# Patient Record
Sex: MALE | Race: BLACK OR AFRICAN AMERICAN | Employment: UNEMPLOYED | URBAN - METROPOLITAN AREA
[De-identification: names, ages, dates, MRNs, and addresses within clinical notes are randomized per-mention and may not be internally consistent; named-entity substitution may affect disease eponyms.]

---

## 2019-02-13 ENCOUNTER — HOSPITAL ENCOUNTER (OUTPATIENT)
Dept: NON INVASIVE DIAGNOSTICS | Age: 39
Discharge: HOME | End: 2019-02-13
Attending: NURSE PRACTITIONER
Payer: COMMERCIAL

## 2019-02-13 VITALS
WEIGHT: 220 LBS | SYSTOLIC BLOOD PRESSURE: 163 MMHG | HEIGHT: 73 IN | DIASTOLIC BLOOD PRESSURE: 103 MMHG | BODY MASS INDEX: 29.16 KG/M2

## 2019-02-13 DIAGNOSIS — R07.9 CHEST PAIN: ICD-10-CM

## 2019-02-13 LAB
STRESS ANGINA INDEX: 0
STRESS BASELINE HR: 71 BPM
STRESS ESTIMATED WORKLOAD: 10 METS
STRESS EXERCISE DUR MIN: NORMAL
STRESS PEAK DIAS BP: 92 MMHG
STRESS PEAK SYS BP: 184 MMHG
STRESS PERCENT HR ACHIEVED: 94 %
STRESS POST PEAK HR: 146 BPM
STRESS RATE PRESSURE PRODUCT: NORMAL BPM*MMHG
STRESS SR DUKE TREADMILL SCORE: -5
STRESS ST DEPRESSION: 1 MM
STRESS TARGET HR: 155 BPM

## 2019-02-13 PROCEDURE — 93351 STRESS TTE COMPLETE: CPT

## 2019-05-17 ENCOUNTER — OFFICE VISIT (OUTPATIENT)
Dept: CARDIOLOGY CLINIC | Age: 39
End: 2019-05-17

## 2019-05-17 DIAGNOSIS — R07.2 PRECORDIAL PAIN: Primary | ICD-10-CM

## 2019-05-17 RX ORDER — ERGOCALCIFEROL 1.25 MG/1
CAPSULE ORAL
Refills: 0 | COMMUNITY
Start: 2019-03-06 | End: 2022-06-27

## 2019-05-17 RX ORDER — METOPROLOL SUCCINATE 25 MG/1
12.5 TABLET, EXTENDED RELEASE ORAL DAILY
COMMUNITY
End: 2019-05-20 | Stop reason: SDUPTHER

## 2019-05-17 RX ORDER — AMLODIPINE BESYLATE 5 MG/1
TABLET ORAL
Refills: 0 | COMMUNITY
Start: 2019-03-06 | End: 2022-06-27

## 2019-05-17 RX ORDER — METFORMIN HYDROCHLORIDE 500 MG/1
TABLET, EXTENDED RELEASE ORAL
Refills: 0 | COMMUNITY
Start: 2019-03-06 | End: 2022-06-27

## 2019-05-17 NOTE — PROGRESS NOTES
1. Have you been to the ER, urgent care clinic since your last visit? Hospitalized since your last visit? No     2. Have you seen or consulted any other health care providers outside of the [address] [place] [address] since your last visit? Include any pap smears or colon screening.   Yes

## 2019-05-17 NOTE — PROGRESS NOTES
[facility]    Mr. Henok Mojica is 39-year male with a history of diabetes, hypertension hyperlipidemia    Patient is here today for cardiac evaluation. He denies any prior history of myocardial infarction or congestive heart failure. Patient has been experiencing on and off left arm dull aching discomfort especially on exertion for couple years. He has undergone stress test twice for this reason. He described this discomfort as a dull aching sensation especially when he is exerting himself or mowing alone and usually resolves after rest.  This last for about less than 2 to 3 minutes. There is no associated nausea vomiting or diaphoresis. Occasionally he would feel some funny sensation in the chest however left arm dull achiness is the main concern. He denies any palpitation, presyncope or syncope. He denies PND or lower extremity swelling. There is no resting discomfort  Denies any nausea, vomiting, abdominal pain, fever, chills, sputum production. No hematuria or other bleeding complaints    Past Medical History:   Diagnosis Date    Diabetes ([facility] [address]     HLD (hyperlipidemia)     HTN (hypertension)          No past surgical history on file. Current Outpatient Medications   Medication Sig    amLODIPine (NORVASC) 5 mg tablet take 1 tablet by mouth once daily    metFORMIN ER (GLUCOPHAGE XR) 500 mg tablet take 1 tablet by mouth twice a day    metoprolol succinate (TOPROL XL) 25 mg XL tablet Take 12.5 mg by mouth daily.  VITAMIN D2 50,000 unit capsule take 1 capsule by mouth every week with food     No current facility-administered medications for this visit. Allergies and Sensitivities:  No Known Allergies    Family History:  No family history on file.     Social History:  Social History     Tobacco Use    Smoking status: Not on file   Substance Use Topics    Alcohol use: Not on file    Drug use: Not on file     She  has no tobacco history on file. She  has no alcohol history on file. Review of Systems:  Cardiac symptoms as noted above in HPI. All others negative. Denies fatigue, malaise, skin rash, joint pain, blurring vision, photophobia, neck pain, hemoptysis, chronic cough, nausea, vomiting, hematuria, burning micturition, BRBPR, chronic headaches. Physical Exam:  BP Readings from Last 3 Encounters:   02/13/19 (!) 163/103         Pulse Readings from Last 3 Encounters:   No data found for Pulse          Wt Readings from Last 3 Encounters:   02/13/19 220 lb (99.8 kg)       Constitutional: Oriented to person, place, and time. HENT: Head: Normocephalic and atraumatic. Eyes: Conjunctivae and extraocular motions are normal.   Neck: No JVD present. Carotid bruit is not appreciated. Cardiovascular: Regular rhythm. No murmur, gallop or rubs appreciated  Lung: Breath sounds normal. No respiratory distress. No ronchi or rales appreciated  Abdominal: No tenderness. No rebound and no guarding. Musculoskeletal: There is no lower extremity edema. No cynosis  Lymphadenopathy:  No cervical or supraclavicular adenopathy appriciated. Neurological: No gross motor deficit noted. Skin: No visible skin rash noted. No Ear discharge noted  Psychiatric: Normal mood and affect. Good distal pulse      Review of Data  LABS:   No results found for: NA, K, CL, CO2, GLU, BUN, CREA  No flowsheet data found. No results found for: GPT, ALT  No results found for: HBA1C, HGBE8, KPT0JZON, GCN5AMJJ    EKG    ECHO    STRESS TEST (02/16)  Exercise duration 9 minutes. Inconclusive EKG portion  Baseline Echocardiographic Data Normal baseline left ventricular systolic function. No pericardial effusion. Stress Echocardiographic Data Left ventricular cavity size decreased from baseline. Left ventricular systolic function improved from baseline. Normal wall motion, unchanged from baseline.      IMPRESSION & PLAN:  Mr. Chino Hodge is 70-year-old male with diabetes, hypertension, hyper lipidemia    Diabetes:  He is supposed to be on metformin however he has recently stopped taking metformin assuming that may be causing his symptoms. I have asked him that he should talk to primary care provider and restart metformin. Goal hemoglobin A1c less than 7 is recommended from cardia vascular standpoint    Hypertension:  Blood pressure today is 138/92 Arias mercury. He is on Norvasc. I will start him on Toprol-XL 12.5 mg daily    Hyperlipidemia:  He said he has hyperlipidemia and he is taking a medication. He does not remember the name. He was advised to go home and call us with a list of medication    Left arm discomfort:  Patient has been expressing exertional left arm discomfort with occasional dull chest discomfort for couple years. He had a nuclear stress test at [facility] in 2016 which was low risk test.  He also recently had a stress echocardiogram which was also unremarkable. Both test are low risk test.  I discussed with patient today and the mother regarding low risk nature of stress test.  His symptoms are ongoing and somewhat concerning especially because of his risk factor. I discussed and offered left heart catheterization however patient has refused any invasive work-up. Risk benefits alternatives were discussed with the patient. I am going to ask him that he undergo CT coronary angiogram for coronary evaluation because of ongoing symptoms    This plan was discussed with patient who is in agreement. Thank you for allowing me to participate in patient care. Please feel free to call me if you have any question or concern. Di Feliz MD  Please note: This document has been produced using voice recognition software. Unrecognized errors in transcription may be present.

## 2019-05-20 RX ORDER — METOPROLOL SUCCINATE 25 MG/1
12.5 TABLET, EXTENDED RELEASE ORAL DAILY
Qty: 30 TAB | Refills: 6 | Status: SHIPPED | OUTPATIENT
Start: 2019-05-20 | End: 2022-06-27

## 2019-05-20 NOTE — TELEPHONE ENCOUNTER
Had to call patient to verify pharmacy. Rx per Dr. Randhawa Model from 5/17/19 office visit.        Verbal order and read back per Mary Arcos MD

## 2019-06-06 ENCOUNTER — TELEPHONE (OUTPATIENT)
Dept: CARDIOLOGY CLINIC | Age: 39
End: 2019-06-06

## 2019-06-06 NOTE — LETTER
6/11/2019 2:41 PM    Ms. Espinoza [address] [place]  [address]  [place] [address]      Dear Nessa Benítez,      I hope this letter finds you well. I have been unable to reach you via phone. We received a fax from [facility] stating that you will need to have premedication of metoprolol before testing. Verbal order and read back per Isi Vasquez MD  Patient is to take metoprolol 25 mg tablet 12 hrs before procedure and then 1 hrs before scan. Sincerely,       DINA Rashid LPN

## 2019-06-06 NOTE — TELEPHONE ENCOUNTER
Received fax from [facility] stating that pt needs to have premedication of metoprolol before testing. Verbal order and read back per Beni Estrella MD  Pt is to take metoprolol 25 mg tablet 12 hrs before procedure and then 1 hrs before scan.

## 2019-06-11 NOTE — TELEPHONE ENCOUNTER
Attempted to contact pt at  number, no answer. No vm p/u. After unsuccessful attempts to contact pt will send letter to address on file.

## 2022-04-20 ENCOUNTER — HOSPITAL ENCOUNTER (OUTPATIENT)
Dept: ULTRASOUND IMAGING | Age: 42
Discharge: HOME | End: 2022-04-20
Attending: FAMILY MEDICINE
Payer: MEDICAID

## 2022-04-20 ENCOUNTER — TRANSCRIBE ORDER (OUTPATIENT)
Dept: SCHEDULING | Age: 42
End: 2022-04-20

## 2022-04-20 DIAGNOSIS — N50.89 SWELLING OF RIGHT TESTICLE: ICD-10-CM

## 2022-04-20 DIAGNOSIS — N50.89 SWELLING OF RIGHT TESTICLE: Primary | ICD-10-CM

## 2022-04-20 PROCEDURE — 93975 VASCULAR STUDY: CPT

## 2022-04-25 ENCOUNTER — TRANSCRIBE ORDER (OUTPATIENT)
Dept: SCHEDULING | Age: 42
End: 2022-04-25

## 2022-05-02 ENCOUNTER — TRANSCRIBE ORDER (OUTPATIENT)
Dept: SCHEDULING | Age: 42
End: 2022-05-02

## 2022-05-02 DIAGNOSIS — N49.2 ABSCESS OF SCROTUM: Primary | ICD-10-CM

## 2022-05-02 DIAGNOSIS — N50.89 ENLARGED TESTICLE: ICD-10-CM

## 2022-05-02 DIAGNOSIS — C62.90 TESTICULAR CANCER (HCC): ICD-10-CM

## 2022-05-05 ENCOUNTER — HOSPITAL ENCOUNTER (OUTPATIENT)
Dept: CT IMAGING | Age: 42
Discharge: HOME | End: 2022-05-05
Attending: PHYSICIAN ASSISTANT
Payer: COMMERCIAL

## 2022-05-05 DIAGNOSIS — C62.90 TESTICULAR CANCER (HCC): ICD-10-CM

## 2022-05-05 DIAGNOSIS — N50.89 ENLARGED TESTICLE: ICD-10-CM

## 2022-05-05 DIAGNOSIS — N49.2 ABSCESS OF SCROTUM: ICD-10-CM

## 2022-05-05 LAB — CREAT UR-MCNC: 1 MG/DL (ref 0.6–1.3)

## 2022-05-05 PROCEDURE — 74177 CT ABD & PELVIS W/CONTRAST: CPT

## 2022-05-05 PROCEDURE — 82565 ASSAY OF CREATININE: CPT

## 2022-05-05 PROCEDURE — 74011000636 HC RX REV CODE- 636

## 2022-05-05 RX ADMIN — IOPAMIDOL 100 ML: 612 INJECTION, SOLUTION INTRAVENOUS at 16:07
